# Patient Record
Sex: FEMALE | Race: BLACK OR AFRICAN AMERICAN | NOT HISPANIC OR LATINO | URBAN - METROPOLITAN AREA
[De-identification: names, ages, dates, MRNs, and addresses within clinical notes are randomized per-mention and may not be internally consistent; named-entity substitution may affect disease eponyms.]

---

## 2018-07-31 ENCOUNTER — EMERGENCY (EMERGENCY)
Facility: HOSPITAL | Age: 48
LOS: 1 days | Discharge: HOME | End: 2018-07-31
Attending: EMERGENCY MEDICINE
Payer: SELF-PAY

## 2018-07-31 VITALS
HEART RATE: 83 BPM | OXYGEN SATURATION: 99 % | SYSTOLIC BLOOD PRESSURE: 148 MMHG | WEIGHT: 229.94 LBS | DIASTOLIC BLOOD PRESSURE: 89 MMHG | HEIGHT: 63 IN | RESPIRATION RATE: 18 BRPM | TEMPERATURE: 98 F

## 2018-07-31 VITALS
DIASTOLIC BLOOD PRESSURE: 81 MMHG | RESPIRATION RATE: 18 BRPM | SYSTOLIC BLOOD PRESSURE: 138 MMHG | OXYGEN SATURATION: 100 % | TEMPERATURE: 98 F | HEART RATE: 75 BPM

## 2018-07-31 PROBLEM — Z00.00 ENCOUNTER FOR PREVENTIVE HEALTH EXAMINATION: Status: ACTIVE | Noted: 2018-07-31

## 2018-07-31 PROCEDURE — 99283 EMERGENCY DEPT VISIT LOW MDM: CPT

## 2018-07-31 RX ORDER — CYCLOBENZAPRINE HYDROCHLORIDE 10 MG/1
1 TABLET, FILM COATED ORAL
Qty: 14 | Refills: 0 | OUTPATIENT
Start: 2018-07-31 | End: 2018-08-06

## 2018-07-31 RX ORDER — LOSARTAN POTASSIUM 100 MG/1
1 TABLET, FILM COATED ORAL
Qty: 0 | Refills: 0 | COMMUNITY

## 2018-07-31 RX ORDER — METFORMIN HYDROCHLORIDE 850 MG/1
1 TABLET ORAL
Qty: 0 | Refills: 0 | COMMUNITY

## 2018-07-31 NOTE — ED PROVIDER NOTE - MUSCULOSKELETAL, MLM
Spine appears normal, FROM, bilateral distal pulses symmetrical and intact. Bilateral hand  intact, 5/5 motor function, sensation to light touch intact, capillary refill intact, no ecchymosis, no cervical midline TTP, FROM spine, (+) minimal soft tissue TTP.

## 2018-07-31 NOTE — ED ADULT NURSE NOTE - CHIEF COMPLAINT QUOTE
c/o right arm pain x4 months, sunday started paresthesia in her fingers  atraumatic, no injury noted.

## 2018-07-31 NOTE — ED ADULT NURSE NOTE - NSIMPLEMENTINTERV_GEN_ALL_ED
Implemented All Universal Safety Interventions:  [place] to call system. Call bell, personal items and telephone within reach. Instruct patient to call for assistance. Room bathroom lighting operational. Non-slip footwear when patient is off stretcher. Physically safe environment: no spills, clutter or unnecessary equipment. Stretcher in lowest position, wheels locked, appropriate side rails in place.

## 2018-07-31 NOTE — ED PROVIDER NOTE - CARE PLAN
Principal Discharge DX:	Muscle strain Principal Discharge DX:	Muscle strain  Secondary Diagnosis:	Cervical radiculopathy

## 2018-07-31 NOTE — ED PROVIDER NOTE - ATTENDING CONTRIBUTION TO CARE
I have personally performed a face to face diagnostic evaluation on this patient.  I have reviewed the PA note and agree with the history, exam, and plan of care, except as noted.  History and Exam by me shows 48 female c.o right arm pain for the past 4 months, now feeling numbness and tingling to right thumb, 2nd index finger, on exam muscle strength 5/5, sensation intact, radial pulse (+), cap refill < 2 sec, patient given number of orthopedic spine [phone] to call for prompt follow up this week, to get steroids and muscle relaxer.

## 2018-07-31 NOTE — ED PROVIDER NOTE - MEDICAL DECISION MAKING DETAILS
47 y/o female presents today with c/o9 right sided arm pain x 4 months. pt notes she presented today due to persistent pain increasing with numbness/tingling to right thumb. 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 pt describes pain as aching, non-radiating, worsened with arm movements, and currently 6/10. pt notes she feels that she has "bone pain".  pt notes she takes Excedrin for pain with some relief. benign exam, mininmal soft tissue TTP. NO cervical midline TTP. 5/5 motor function. Pt offered xray though unlikely in the an atraumatic setting but declined. Pt advised of importance for ortho follow up. Advised may benefit from PT, possible outpatient  MRI. pt educated on radiculopathy, paresthesias, and educated on possible carpal tunnel syndrome. Pt advised of symptoms to be cautious of warranting immediate return. pt advised of importance for close follow up with PCP and specialist.

## 2018-07-31 NOTE — ED PROVIDER NOTE - OBJECTIVE STATEMENT
47 y/o female presents today with c/o9 right sided arm pain x 4 months. pt notes she presented today due to persistent pain increasing with numbness/tingling to right thumb. 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 pt describes pain as aching, non-radiating, worsened with arm movements, and currently 6/10. pt notes she feels that she has "bone pain".  pt notes she takes Excedrin for pain with some relief. pt denies trauma, HA currently, CP, SOB, fever, weakness, or any other complaints.

## 2020-05-31 ENCOUNTER — TRANSCRIPTION ENCOUNTER (OUTPATIENT)
Age: 50
End: 2020-05-31

## 2020-10-05 PROBLEM — I10 ESSENTIAL (PRIMARY) HYPERTENSION: Chronic | Status: ACTIVE | Noted: 2018-07-31

## 2020-10-05 PROBLEM — E11.9 TYPE 2 DIABETES MELLITUS WITHOUT COMPLICATIONS: Chronic | Status: ACTIVE | Noted: 2018-07-31

## 2020-11-10 ENCOUNTER — APPOINTMENT (OUTPATIENT)
Dept: OBGYN | Facility: CLINIC | Age: 50
End: 2020-11-10
Payer: COMMERCIAL

## 2020-11-10 VITALS
DIASTOLIC BLOOD PRESSURE: 88 MMHG | HEIGHT: 63 IN | HEART RATE: 82 BPM | SYSTOLIC BLOOD PRESSURE: 145 MMHG | TEMPERATURE: 97.9 F | WEIGHT: 219 LBS | BODY MASS INDEX: 38.8 KG/M2

## 2020-11-10 DIAGNOSIS — I10 ESSENTIAL (PRIMARY) HYPERTENSION: ICD-10-CM

## 2020-11-10 DIAGNOSIS — E11.9 TYPE 2 DIABETES MELLITUS W/OUT COMPLICATIONS: ICD-10-CM

## 2020-11-10 DIAGNOSIS — E10.641 TYPE 1 DIABETES MELLITUS WITH HYPOGLYCEMIA WITH COMA: ICD-10-CM

## 2020-11-10 DIAGNOSIS — Z01.411 ENCOUNTER FOR GYNECOLOGICAL EXAMINATION (GENERAL) (ROUTINE) WITH ABNORMAL FINDINGS: ICD-10-CM

## 2020-11-10 DIAGNOSIS — E66.9 OBESITY, UNSPECIFIED: ICD-10-CM

## 2020-11-10 DIAGNOSIS — N95.1 MENOPAUSAL AND FEMALE CLIMACTERIC STATES: ICD-10-CM

## 2020-11-10 DIAGNOSIS — Z78.9 OTHER SPECIFIED HEALTH STATUS: ICD-10-CM

## 2020-11-10 PROCEDURE — 99386 PREV VISIT NEW AGE 40-64: CPT

## 2020-11-10 PROCEDURE — 99072 ADDL SUPL MATRL&STAF TM PHE: CPT

## 2020-11-10 NOTE — PHYSICAL EXAM
[Appropriately responsive] : appropriately responsive [Alert] : alert [No Acute Distress] : no acute distress [No Lymphadenopathy] : no lymphadenopathy [Regular Rate Rhythm] : regular rate rhythm [No Murmurs] : no murmurs [Clear to Auscultation B/L] : clear to auscultation bilaterally [Soft] : soft [Non-tender] : non-tender [Non-distended] : non-distended [No HSM] : No HSM [No Lesions] : no lesions [No Mass] : no mass [Oriented x3] : oriented x3 [Examination Of The Breasts] : a normal appearance [No Masses] : no breast masses were palpable [Labia Majora] : normal [Labia Minora] : normal [Normal] : normal [Normal Position] : in a normal position [Enlarged ___ wks] : enlarged [unfilled] ~Uweeks [Uterine Adnexae] : normal

## 2020-11-10 NOTE — HISTORY OF PRESENT ILLNESS
[FreeTextEntry1] : 50 y.o. P 1031 LNMP  11/3/20 for  annual exam.  Oct. 2020  , pt feels well. pt reports VMS. PMH - Diabetes - Metformin, 2000mg daily  HTN - Losartan 50mg once daily , Glaucoma - treated with eye drops. PSHx - negative FH - breast cancer - mother, pat. aunt. , Aneurysm, - pat GM, Diabetes - both sides of family, Alzheimers - MGM, , HTN - mother, both grandmothers , SH - negative, GYN  hx - hx of abnl. paps s/p colpo. , OB hx -  male,  FT, ETOP x 3, ROS - .

## 2020-11-10 NOTE — DISCUSSION/SUMMARY
[FreeTextEntry1] : A - WwV\par     obesity\par      NIDDM\par     HTN\par     female climacteric state\par     chronic  constipation \par \par p- f/u for results\par     referral for pelvic sono given to assess uterus and adnexae secondary to body habitus\par      exercise encouraged\par      nutritional counseling provided\par       pap , FSH, LH, done\par      referral for mammo an Dexa given \par     referral for G.I. colorectal screening given , DEVENDRA Olvera.

## 2020-11-11 LAB
FSH SERPL-MCNC: 6.7 IU/L
LH SERPL-ACNC: 12.7 IU/L

## 2020-11-13 LAB — HPV HIGH+LOW RISK DNA PNL CVX: NOT DETECTED

## 2020-11-16 LAB — CYTOLOGY CVX/VAG DOC THIN PREP: ABNORMAL

## 2020-11-23 ENCOUNTER — APPOINTMENT (OUTPATIENT)
Dept: OBGYN | Facility: CLINIC | Age: 50
End: 2020-11-23
Payer: COMMERCIAL

## 2020-11-23 ENCOUNTER — ASOB RESULT (OUTPATIENT)
Age: 50
End: 2020-11-23

## 2020-11-23 PROCEDURE — 76830 TRANSVAGINAL US NON-OB: CPT

## 2020-11-23 PROCEDURE — 76857 US EXAM PELVIC LIMITED: CPT

## 2020-11-25 ENCOUNTER — NON-APPOINTMENT (OUTPATIENT)
Age: 50
End: 2020-11-25

## 2020-11-25 DIAGNOSIS — R19.00 INTRA-ABDOMINAL AND PELVIC SWELLING, MASS AND LUMP, UNSPECIFIED SITE: ICD-10-CM

## 2020-12-02 ENCOUNTER — NON-APPOINTMENT (OUTPATIENT)
Age: 50
End: 2020-12-02

## 2021-01-14 ENCOUNTER — APPOINTMENT (OUTPATIENT)
Dept: MRI IMAGING | Facility: IMAGING CENTER | Age: 51
End: 2021-01-14

## 2021-02-12 DIAGNOSIS — R10.2 PELVIC AND PERINEAL PAIN: ICD-10-CM

## 2021-02-24 ENCOUNTER — NON-APPOINTMENT (OUTPATIENT)
Age: 51
End: 2021-02-24

## 2021-03-08 ENCOUNTER — NON-APPOINTMENT (OUTPATIENT)
Age: 51
End: 2021-03-08

## 2022-03-19 ENCOUNTER — APPOINTMENT (OUTPATIENT)
Dept: MAMMOGRAPHY | Facility: CLINIC | Age: 52
End: 2022-03-19
Payer: MEDICAID

## 2022-03-19 ENCOUNTER — OUTPATIENT (OUTPATIENT)
Dept: OUTPATIENT SERVICES | Facility: HOSPITAL | Age: 52
LOS: 1 days | End: 2022-03-19
Payer: MEDICAID

## 2022-03-19 DIAGNOSIS — Z00.8 ENCOUNTER FOR OTHER GENERAL EXAMINATION: ICD-10-CM

## 2022-03-19 PROCEDURE — 77067 SCR MAMMO BI INCL CAD: CPT | Mod: 26

## 2022-03-19 PROCEDURE — 77067 SCR MAMMO BI INCL CAD: CPT

## 2022-03-19 PROCEDURE — 77063 BREAST TOMOSYNTHESIS BI: CPT | Mod: 26

## 2022-03-19 PROCEDURE — 77063 BREAST TOMOSYNTHESIS BI: CPT

## 2022-03-22 PROBLEM — Z00.00 ENCOUNTER FOR PREVENTIVE HEALTH EXAMINATION: Noted: 2022-03-22

## 2022-03-24 ENCOUNTER — OUTPATIENT (OUTPATIENT)
Dept: OUTPATIENT SERVICES | Facility: HOSPITAL | Age: 52
LOS: 1 days | End: 2022-03-24
Payer: MEDICAID

## 2022-03-24 ENCOUNTER — APPOINTMENT (OUTPATIENT)
Dept: ULTRASOUND IMAGING | Facility: CLINIC | Age: 52
End: 2022-03-24
Payer: MEDICAID

## 2022-03-24 ENCOUNTER — APPOINTMENT (OUTPATIENT)
Dept: ULTRASOUND IMAGING | Facility: CLINIC | Age: 52
End: 2022-03-24

## 2022-03-24 DIAGNOSIS — Z00.8 ENCOUNTER FOR OTHER GENERAL EXAMINATION: ICD-10-CM

## 2022-03-24 PROCEDURE — 76642 ULTRASOUND BREAST LIMITED: CPT

## 2022-03-24 PROCEDURE — 76642 ULTRASOUND BREAST LIMITED: CPT | Mod: 26,50

## 2022-03-30 ENCOUNTER — APPOINTMENT (OUTPATIENT)
Dept: ULTRASOUND IMAGING | Facility: CLINIC | Age: 52
End: 2022-03-30
Payer: MEDICAID

## 2022-03-30 ENCOUNTER — RESULT REVIEW (OUTPATIENT)
Age: 52
End: 2022-03-30

## 2022-03-30 ENCOUNTER — OUTPATIENT (OUTPATIENT)
Dept: OUTPATIENT SERVICES | Facility: HOSPITAL | Age: 52
LOS: 1 days | End: 2022-03-30
Payer: MEDICAID

## 2022-03-30 DIAGNOSIS — R92.8 OTHER ABNORMAL AND INCONCLUSIVE FINDINGS ON DIAGNOSTIC IMAGING OF BREAST: ICD-10-CM

## 2022-03-30 DIAGNOSIS — Z00.8 ENCOUNTER FOR OTHER GENERAL EXAMINATION: ICD-10-CM

## 2022-03-30 PROCEDURE — 77065 DX MAMMO INCL CAD UNI: CPT

## 2022-03-30 PROCEDURE — 88305 TISSUE EXAM BY PATHOLOGIST: CPT

## 2022-03-30 PROCEDURE — 19083 BX BREAST 1ST LESION US IMAG: CPT

## 2022-03-30 PROCEDURE — A4648: CPT

## 2022-03-30 PROCEDURE — 88305 TISSUE EXAM BY PATHOLOGIST: CPT | Mod: 26

## 2022-03-30 PROCEDURE — 77065 DX MAMMO INCL CAD UNI: CPT | Mod: 26,RT

## 2022-03-30 PROCEDURE — 19083 BX BREAST 1ST LESION US IMAG: CPT | Mod: RT

## 2022-03-31 ENCOUNTER — TRANSCRIPTION ENCOUNTER (OUTPATIENT)
Age: 52
End: 2022-03-31

## 2022-04-14 ENCOUNTER — APPOINTMENT (OUTPATIENT)
Dept: SURGICAL ONCOLOGY | Facility: CLINIC | Age: 52
End: 2022-04-14
Payer: COMMERCIAL

## 2022-04-14 VITALS
RESPIRATION RATE: 16 BRPM | BODY MASS INDEX: 38.98 KG/M2 | HEART RATE: 81 BPM | TEMPERATURE: 97.8 F | HEIGHT: 63 IN | DIASTOLIC BLOOD PRESSURE: 88 MMHG | WEIGHT: 220 LBS | SYSTOLIC BLOOD PRESSURE: 136 MMHG | OXYGEN SATURATION: 98 %

## 2022-04-14 DIAGNOSIS — Z80.3 FAMILY HISTORY OF MALIGNANT NEOPLASM OF BREAST: ICD-10-CM

## 2022-04-14 PROCEDURE — 99204 OFFICE O/P NEW MOD 45 MIN: CPT

## 2022-04-14 RX ORDER — SIMVASTATIN 80 MG/1
TABLET, FILM COATED ORAL
Refills: 0 | Status: ACTIVE | COMMUNITY

## 2022-04-14 RX ORDER — METFORMIN HYDROCHLORIDE 625 MG/1
TABLET ORAL
Refills: 0 | Status: ACTIVE | COMMUNITY

## 2022-04-14 RX ORDER — LOSARTAN POTASSIUM 100 MG/1
TABLET, FILM COATED ORAL
Refills: 0 | Status: ACTIVE | COMMUNITY

## 2022-04-14 NOTE — PAST MEDICAL HISTORY
[Postmenopausal] : The patient is postmenopausal [Menarche Age ____] : age at menarche was [unfilled] [Menopause Age____] : age at menopause was [unfilled] [History of Hormone Replacement Treatment] : has no history of hormone replacement treatment [Total Preg ___] : G[unfilled] [Live Births ___] : P[unfilled]  [Abortions ___] : Abortions:[unfilled] [AB Spont ___] : miscarriages: [unfilled]  [Age At Live Birth ___] : Age at live birth: [unfilled] [FreeTextEntry6] : none [FreeTextEntry7] : 2 months [FreeTextEntry8] : 8 months

## 2022-04-14 NOTE — ASSESSMENT
[FreeTextEntry1] : The patient is a 51 year old female with right breast IDP\par \par As these are high risk markers, excision is recommended.  I discussed with the patient that papillary lesions are difficult for pathologist to read on needle biopsy and thus excision is always recommended, to allow histologic evaluation of the entire lesion.  There is approximately 5 to 8% incidence of upgrading of these lesions, most commonly to either atypical hyperplasia or DCIS.  If this is not upgraded, this is not a breast cancer risk factor and she should return to routine breast care.  \par \par Preoperative, intraoperative, and postoperative considerations including preoperative localization by Radiology of this nonpalpable mass, intraoperative anesthetic management, and what she can expect in postoperative period were reviewed.\par  \par Risks, benefits, and alternatives to proposed surgical procedure were reviewed and all questions were answered to her satisfaction.\par \par Plan:\par –Right breast excisional biopsy with MagSeed localization (1 site)\par - med clearance requested

## 2022-04-14 NOTE — PHYSICAL EXAM
[Normocephalic] : normocephalic [Atraumatic] : atraumatic [EOMI] : extra ocular movement intact [PERRL] : pupils equal, round and reactive to light [Sclera nonicteric] : sclera nonicteric [Supple] : supple [No Supraclavicular Adenopathy] : no supraclavicular adenopathy [No Cervical Adenopathy] : no cervical adenopathy [No Thyromegaly] : no thyromegaly [Examined in the supine and seated position] : examined in the supine and seated position [Symmetrical] : symmetrical [Bra Size: ___] : Bra Size: [unfilled] [Grade 3] : Ptosis Grade 3 [No dominant masses] : no dominant masses in right breast  [No dominant masses] : no dominant masses left breast [No Nipple Retraction] : no left nipple retraction [No Nipple Discharge] : no left nipple discharge [Breast Mass Right Breast ___cm] : no masses [Breast Mass Left Breast ___cm] : no masses [Breast Nipple Inversion] : nipples not inverted [Breast Nipple Retraction] : nipples not retracted [Breast Nipple Flattening] : nipples not flattened [Breast Nipple Fissures] : nipples not fissured [Breast Abnormal Lactation (Galactorrhea)] : no galactorrhea [Breast Abnormal Secretion Bloody Fluid] : no bloody discharge [Breast Abnormal Secretion Serous Fluid] : no serous discharge [Breast Abnormal Secretion Opalescent Fluid] : no milky discharge [No Axillary Lymphadenopathy] : no left axillary lymphadenopathy [No Edema] : no edema [No Rashes] : no rashes [No Ulceration] : no ulceration [de-identified] : non-labored respirations  [de-identified] : 9:00 CNB site healing well, no masses

## 2022-04-14 NOTE — HISTORY OF PRESENT ILLNESS
[FreeTextEntry1] : The patient is a 51 year old female here for initial evaluation of Right breast IDP.\par \par The patient reports that she has not had any recent breast imaging since a mammogram in her early 20s after breast trauma. She notes that she only decided to get imaging now due to her mother having breast cancer diagnosed age 64 and her paternal aunt diagnosed with breast cancer age 50s. \par \par Her most recent imaging showed:\par 3/19/2022 B/L SM (NW), TC 18.3%, scattered fibroglandular densities\par –L UO posterior breast circumscribed mass\par –R LO breast circumscribed mass–L borderline prominent lymph nodes (1/21 Covid vaccination)\par –BR0, bilateral targeted ultrasound\par \par 3/24/2022 B/L ultrasound\par –L 2:00 N12 14 mm cyst\par –R 9:00 N7 5 x 4 x 7 mm hypoechoic mass–>BX\par –No adenopathy; prominent lymph nodes bilaterally\par –BR4a\par \par 3/30/2022 R USG–CNB\par –R 9:00 N7 (ribbon): IDP\par \par Today, the patient reports no breast complaints. Denies pain, masses, skin changes, or nipple discharge. \par \par She reports HTN, DM, and HLD for which she takes losartan, metformin, and simvastatin. \par She has not had any prior surgeries.\par Family history of breast cancer noted above. Denies any other cancer history.\par

## 2022-04-14 NOTE — CONSULT LETTER
[Dear  ___] : Dear  [unfilled], [Consult Letter:] : I had the pleasure of evaluating your patient, [unfilled]. [Please see my note below.] : Please see my note below. [Consult Closing:] : Thank you very much for allowing me to participate in the care of this patient.  If you have any questions, please do not hesitate to contact me. [Sincerely,] : Sincerely, [FreeTextEntry3] : Emelia Mendenhall MD\par Breast Surgeon\par Division of Surgical Oncology\par Department of Surgery\par [address] [place] [address]\[place] [address] \par Tel: [phone]\par Fax: [phone]\par Email: [email or web address]

## 2022-04-25 ENCOUNTER — OUTPATIENT (OUTPATIENT)
Dept: OUTPATIENT SERVICES | Facility: HOSPITAL | Age: 52
LOS: 1 days | End: 2022-04-25
Payer: MEDICAID

## 2022-04-25 VITALS
HEIGHT: 62 IN | HEART RATE: 75 BPM | OXYGEN SATURATION: 99 % | DIASTOLIC BLOOD PRESSURE: 80 MMHG | SYSTOLIC BLOOD PRESSURE: 130 MMHG | WEIGHT: 227.96 LBS | RESPIRATION RATE: 16 BRPM | TEMPERATURE: 98 F

## 2022-04-25 DIAGNOSIS — D24.1 BENIGN NEOPLASM OF RIGHT BREAST: ICD-10-CM

## 2022-04-25 DIAGNOSIS — I10 ESSENTIAL (PRIMARY) HYPERTENSION: ICD-10-CM

## 2022-04-25 DIAGNOSIS — E11.9 TYPE 2 DIABETES MELLITUS WITHOUT COMPLICATIONS: ICD-10-CM

## 2022-04-25 LAB
A1C WITH ESTIMATED AVERAGE GLUCOSE RESULT: 6.8 % — HIGH (ref 4–5.6)
ANION GAP SERPL CALC-SCNC: 12 MMOL/L — SIGNIFICANT CHANGE UP (ref 7–14)
BUN SERPL-MCNC: 15 MG/DL — SIGNIFICANT CHANGE UP (ref 7–23)
CALCIUM SERPL-MCNC: 9.3 MG/DL — SIGNIFICANT CHANGE UP (ref 8.4–10.5)
CHLORIDE SERPL-SCNC: 102 MMOL/L — SIGNIFICANT CHANGE UP (ref 98–107)
CO2 SERPL-SCNC: 22 MMOL/L — SIGNIFICANT CHANGE UP (ref 22–31)
CREAT SERPL-MCNC: 0.78 MG/DL — SIGNIFICANT CHANGE UP (ref 0.5–1.3)
EGFR: 91 ML/MIN/1.73M2 — SIGNIFICANT CHANGE UP
ESTIMATED AVERAGE GLUCOSE: 148 — SIGNIFICANT CHANGE UP
GLUCOSE SERPL-MCNC: 152 MG/DL — HIGH (ref 70–99)
HCG SERPL-ACNC: <5 MIU/ML — SIGNIFICANT CHANGE UP
HCT VFR BLD CALC: 38.9 % — SIGNIFICANT CHANGE UP (ref 34.5–45)
HGB BLD-MCNC: 13.2 G/DL — SIGNIFICANT CHANGE UP (ref 11.5–15.5)
MCHC RBC-ENTMCNC: 30.8 PG — SIGNIFICANT CHANGE UP (ref 27–34)
MCHC RBC-ENTMCNC: 33.9 GM/DL — SIGNIFICANT CHANGE UP (ref 32–36)
MCV RBC AUTO: 90.7 FL — SIGNIFICANT CHANGE UP (ref 80–100)
NRBC # BLD: 0 /100 WBCS — SIGNIFICANT CHANGE UP
NRBC # FLD: 0 K/UL — SIGNIFICANT CHANGE UP
PLATELET # BLD AUTO: 250 K/UL — SIGNIFICANT CHANGE UP (ref 150–400)
POTASSIUM SERPL-MCNC: 4.5 MMOL/L — SIGNIFICANT CHANGE UP (ref 3.5–5.3)
POTASSIUM SERPL-SCNC: 4.5 MMOL/L — SIGNIFICANT CHANGE UP (ref 3.5–5.3)
RBC # BLD: 4.29 M/UL — SIGNIFICANT CHANGE UP (ref 3.8–5.2)
RBC # FLD: 11.5 % — SIGNIFICANT CHANGE UP (ref 10.3–14.5)
SODIUM SERPL-SCNC: 136 MMOL/L — SIGNIFICANT CHANGE UP (ref 135–145)
WBC # BLD: 6.07 K/UL — SIGNIFICANT CHANGE UP (ref 3.8–10.5)
WBC # FLD AUTO: 6.07 K/UL — SIGNIFICANT CHANGE UP (ref 3.8–10.5)

## 2022-04-25 PROCEDURE — 93010 ELECTROCARDIOGRAM REPORT: CPT

## 2022-04-25 NOTE — H&P PST ADULT - GASTROINTESTINAL COMMENTS
Pt c/o of diffuse tenderness / pressure with palpation - chronic constipation and fibroids Hx fibroids and recent postmenopausal bleeding - pt seen by GYN and w/u being done - surgical biopsy needs to be done

## 2022-04-25 NOTE — H&P PST ADULT - SKIN/BREAST COMMENTS
right breast lump noted on Mammo - biopsy benign No pertinent past medical history right breast lump noted on Mammo - biopsy benign - pt denies palpation

## 2022-04-25 NOTE — H&P PST ADULT - NSICDXPASTMEDICALHX_GEN_ALL_CORE_FT
PAST MEDICAL HISTORY:  Breast lump      PAST MEDICAL HISTORY:  Breast lump     DM (diabetes mellitus)     Fibroids     HLD (hyperlipidemia)     HTN (hypertension)     Obesity

## 2022-04-25 NOTE — H&P PST ADULT - NSANTHOSAYNRD_GEN_A_CORE
No. SHASHANK screening performed.  STOP BANG Legend: 0-2 = LOW Risk; 3-4 = INTERMEDIATE Risk; 5-8 = HIGH Risk

## 2022-04-25 NOTE — H&P PST ADULT - HISTORY OF PRESENT ILLNESS
Pt is a 51 yr old female scheduled for Right Breast Excisional Biopsy with magseed Localization 1 site with Dr Mendenhall tentatively 5/2/22 - Pt had Mammo and noted right mass found - biopsy benign - now for surgery Hx HTN, DM and HLD  Patient instructed to contact surgeon's office concerning COVID test prior to surgery

## 2022-04-28 ENCOUNTER — RESULT REVIEW (OUTPATIENT)
Age: 52
End: 2022-04-28

## 2022-04-28 ENCOUNTER — OUTPATIENT (OUTPATIENT)
Dept: OUTPATIENT SERVICES | Facility: HOSPITAL | Age: 52
LOS: 1 days | End: 2022-04-28
Payer: MEDICAID

## 2022-04-28 ENCOUNTER — APPOINTMENT (OUTPATIENT)
Dept: ULTRASOUND IMAGING | Facility: IMAGING CENTER | Age: 52
End: 2022-04-28
Payer: COMMERCIAL

## 2022-04-28 DIAGNOSIS — Z00.8 ENCOUNTER FOR OTHER GENERAL EXAMINATION: ICD-10-CM

## 2022-04-28 PROCEDURE — 76642 ULTRASOUND BREAST LIMITED: CPT | Mod: 26,50

## 2022-04-28 PROCEDURE — 76642 ULTRASOUND BREAST LIMITED: CPT

## 2022-04-29 VITALS
RESPIRATION RATE: 16 BRPM | SYSTOLIC BLOOD PRESSURE: 168 MMHG | HEART RATE: 83 BPM | WEIGHT: 227.96 LBS | TEMPERATURE: 98 F | OXYGEN SATURATION: 100 % | HEIGHT: 62 IN | DIASTOLIC BLOOD PRESSURE: 79 MMHG

## 2022-04-29 NOTE — ASU PREOPERATIVE ASSESSMENT, ADULT (IPARK ONLY) - FALL HARM RISK - UNIVERSAL INTERVENTIONS
Bed in lowest position, wheels locked, appropriate side rails in place/Call bell, personal items and telephone in reach/Instruct patient to call for assistance before getting out of bed or chair/Non-slip footwear when patient is out of bed/[place] to call system/Physically safe environment - no spills, clutter or unnecessary equipment/Purposeful Proactive Rounding/Room/bathroom lighting operational, light cord in reach

## 2022-05-01 ENCOUNTER — TRANSCRIPTION ENCOUNTER (OUTPATIENT)
Age: 52
End: 2022-05-01

## 2022-05-02 ENCOUNTER — RESULT REVIEW (OUTPATIENT)
Age: 52
End: 2022-05-02

## 2022-05-02 ENCOUNTER — APPOINTMENT (OUTPATIENT)
Dept: MAMMOGRAPHY | Facility: IMAGING CENTER | Age: 52
End: 2022-05-02
Payer: COMMERCIAL

## 2022-05-02 ENCOUNTER — OUTPATIENT (OUTPATIENT)
Dept: OUTPATIENT SERVICES | Facility: HOSPITAL | Age: 52
LOS: 1 days | Discharge: HOME | End: 2022-05-02
Payer: MEDICAID

## 2022-05-02 ENCOUNTER — APPOINTMENT (OUTPATIENT)
Dept: SURGICAL ONCOLOGY | Facility: AMBULATORY SURGERY CENTER | Age: 52
End: 2022-05-02

## 2022-05-02 ENCOUNTER — OUTPATIENT (OUTPATIENT)
Dept: OUTPATIENT SERVICES | Facility: HOSPITAL | Age: 52
LOS: 1 days | End: 2022-05-02
Payer: COMMERCIAL

## 2022-05-02 ENCOUNTER — APPOINTMENT (OUTPATIENT)
Dept: MAMMOGRAPHY | Facility: IMAGING CENTER | Age: 52
End: 2022-05-02

## 2022-05-02 ENCOUNTER — TRANSCRIPTION ENCOUNTER (OUTPATIENT)
Age: 52
End: 2022-05-02

## 2022-05-02 VITALS
HEART RATE: 86 BPM | DIASTOLIC BLOOD PRESSURE: 86 MMHG | SYSTOLIC BLOOD PRESSURE: 155 MMHG | RESPIRATION RATE: 15 BRPM | OXYGEN SATURATION: 98 % | TEMPERATURE: 98 F

## 2022-05-02 DIAGNOSIS — Z00.8 ENCOUNTER FOR OTHER GENERAL EXAMINATION: ICD-10-CM

## 2022-05-02 DIAGNOSIS — D24.1 BENIGN NEOPLASM OF RIGHT BREAST: ICD-10-CM

## 2022-05-02 LAB — GLUCOSE BLDC GLUCOMTR-MCNC: 167 MG/DL — HIGH (ref 70–99)

## 2022-05-02 PROCEDURE — C1889: CPT

## 2022-05-02 PROCEDURE — 19125 EXCISION BREAST LESION: CPT

## 2022-05-02 PROCEDURE — 19281 PERQ DEVICE BREAST 1ST IMAG: CPT

## 2022-05-02 PROCEDURE — 76098 X-RAY EXAM SURGICAL SPECIMEN: CPT | Mod: 26

## 2022-05-02 PROCEDURE — C1739: CPT

## 2022-05-02 PROCEDURE — 19281 PERQ DEVICE BREAST 1ST IMAG: CPT | Mod: RT

## 2022-05-02 PROCEDURE — 88305 TISSUE EXAM BY PATHOLOGIST: CPT | Mod: 26

## 2022-05-02 PROCEDURE — 76098 X-RAY EXAM SURGICAL SPECIMEN: CPT

## 2022-05-02 NOTE — ASU DISCHARGE PLAN (ADULT/PEDIATRIC) - CARE PROVIDER_API CALL
Emelia Mendenhall)  Surgery  [address] [place] [address]  Phone: [phone]  Fax: [phone]  Follow Up Time: 1 week

## 2022-05-02 NOTE — ASU DISCHARGE PLAN (ADULT/PEDIATRIC) - ASU DC SPECIAL INSTRUCTIONSFT
May shower today. Keep tegaderm/telfa on for 48 hours. After 48 hours, may remove, but keep steri-strips until follow-up with Dr. Mendenhall in one week.    Alternate tylenol/motrin for pain as directed on the bottle instructions    Keep bra on May shower tomorrow. Keep tegaderm/telfa on for 48 hours. After 48 hours, may remove, but keep steri-strips until follow-up with Dr. Mendenhall in one week.    Alternate tylenol/motrin for pain as directed on the bottle instructions    Keep bra on

## 2022-05-05 ENCOUNTER — APPOINTMENT (OUTPATIENT)
Dept: SURGICAL ONCOLOGY | Facility: CLINIC | Age: 52
End: 2022-05-05
Payer: MEDICAID

## 2022-05-05 VITALS
SYSTOLIC BLOOD PRESSURE: 149 MMHG | DIASTOLIC BLOOD PRESSURE: 86 MMHG | WEIGHT: 224 LBS | BODY MASS INDEX: 39.69 KG/M2 | HEART RATE: 64 BPM | TEMPERATURE: 97.9 F | HEIGHT: 63 IN | RESPIRATION RATE: 17 BRPM | OXYGEN SATURATION: 98 %

## 2022-05-05 DIAGNOSIS — T14.8XXA OTHER INJURY OF UNSPECIFIED BODY REGION, INITIAL ENCOUNTER: ICD-10-CM

## 2022-05-05 PROCEDURE — 99024 POSTOP FOLLOW-UP VISIT: CPT

## 2022-05-05 NOTE — PHYSICAL EXAM
[Normocephalic] : normocephalic [Atraumatic] : atraumatic [EOMI] : extra ocular movement intact [PERRL] : pupils equal, round and reactive to light [Sclera nonicteric] : sclera nonicteric [Examined in the supine and seated position] : examined in the supine and seated position [de-identified] : non-labored respirations  [de-identified] : Right breast surgical dressing c/d/i, with very minimal dried strikethrough on one portion of the dressing. At 6:00 underneath breast, some ecchymosis noted, no obvious hematoma. Faint scab overlying likely prior wire localization site and likely site of bleeding.

## 2022-05-05 NOTE — CONSULT LETTER
[Dear  ___] : Dear  [unfilled], [Courtesy Letter:] : I had the pleasure of seeing your patient, [unfilled], in my office today. [Please see my note below.] : Please see my note below. [Sincerely,] : Sincerely, [FreeTextEntry3] : Emelia Mendenhall MD\par Breast Surgeon\par Division of Surgical Oncology\par Department of Surgery\par [address] [place] [address]\[place] [address] \par Tel: [phone]\par Fax: [phone]\par Email: [email or web address]

## 2022-05-05 NOTE — HISTORY OF PRESENT ILLNESS
[FreeTextEntry1] : The patient is a 51 year old female here for initial evaluation of Right breast IDP now s/p R breast excisional biopsy 5/2/2022. \par \par Prior history:\par The patient reports that she has not had any recent breast imaging since a mammogram in her early 20s after breast trauma. She notes that she only decided to get imaging now due to her mother having breast cancer diagnosed age 64 and her paternal aunt diagnosed with breast cancer age 50s. \par \par Her most recent imaging showed:\par 3/19/2022 B/L SM (NW), TC 18.3%, scattered fibroglandular densities\par –L UO posterior breast circumscribed mass\par –R LO breast circumscribed mass–L borderline prominent lymph nodes (1/21 Covid vaccination)\par –BR0, bilateral targeted ultrasound\par \par 3/24/2022 B/L ultrasound\par –L 2:00 N12 14 mm cyst\par –R 9:00 N7 5 x 4 x 7 mm hypoechoic mass–>BX\par –No adenopathy; prominent lymph nodes bilaterally\par –BR4a\par \par 3/30/2022 R USG–CNB\par –R 9:00 N7 (ribbon): IDP\par \par The patient reports no breast complaints. Denies pain, masses, skin changes, or nipple discharge. \par \par She reports HTN, DM, and HLD for which she takes losartan, metformin, and simvastatin. \par She has not had any prior surgeries.\par Family history of breast cancer noted above. Denies any other cancer history.\par \par 5/2/2022 R breast excisional biopsy\par  - PATH PENDING\par \par Postop: \par The patient reports that the second morning after her surgery, she noticed some moisture coming from underneath the breast while sleeping, but she did not look and assumed it was sweat. When she awoke, she saw that it was blood staining her shirt. She put a clean paper towel underneath and then monitored it during the day. It continued to drain blood, which she described as copious. The surgical dressing on her breast covering her nipple, however, remained clean, dry and intact. She could not visualize or feel a source of the blood on her lower breast. She left a voicemail on the office line, but did not speak to the on call team. She reports that the bleeding continued, but has slowed. Denies fevers/chills.

## 2022-05-05 NOTE — ASSESSMENT
[FreeTextEntry1] : The patient is a 51 year old female here for initial evaluation of Right breast IDP now s/p R breast excisional biopsy 5/2/2022. Here for a unplanned follow-up due to complaint of right breast bleeding.\par \par Exam showed that the right breast surgical dressing was clean, dry, and intact, with very minimal dried strikethrough on one portion of the dressing. At 6:00 underneath breast, some ecchymosis was noted. There was no obvious hematoma. Faint scab was seen overlying likely prior wire localization site and likely site of bleeding.\par \par We discussed the likely scenario. The surgical dressing was taken down revealing clean steristrips, no evidence of bleeding. The wire localization site at this time appeared to be closed. A dry gauze dressing was placed over the site in case of any additional spotting. We discussed that this has likely already stopped bleeding and any additional bleeding can be stopped with firm pressure to the area. There is no evidence of a breast hematoma, and no evidence of active bleeding.\par \par The patient still has her regularly scheduled postop visit on 5/18. She will return at that time for a follow-up, and we should also have pathology to review at that time.\par \par Plan:\par  - RTO f/u 5/18\par  - awaiting surgical pathology

## 2022-05-06 LAB — SURGICAL PATHOLOGY STUDY: SIGNIFICANT CHANGE UP

## 2022-05-17 PROBLEM — D24.1 INTRADUCTAL PAPILLOMA OF RIGHT BREAST: Status: ACTIVE | Noted: 2022-04-12

## 2022-05-18 ENCOUNTER — APPOINTMENT (OUTPATIENT)
Dept: SURGICAL ONCOLOGY | Facility: CLINIC | Age: 52
End: 2022-05-18
Payer: MEDICAID

## 2022-05-18 VITALS
DIASTOLIC BLOOD PRESSURE: 82 MMHG | RESPIRATION RATE: 16 BRPM | SYSTOLIC BLOOD PRESSURE: 134 MMHG | WEIGHT: 224 LBS | OXYGEN SATURATION: 98 % | BODY MASS INDEX: 39.69 KG/M2 | HEIGHT: 63 IN | HEART RATE: 81 BPM

## 2022-05-18 DIAGNOSIS — D24.1 BENIGN NEOPLASM OF RIGHT BREAST: ICD-10-CM

## 2022-05-18 PROCEDURE — 99024 POSTOP FOLLOW-UP VISIT: CPT

## 2022-05-18 NOTE — HISTORY OF PRESENT ILLNESS
[FreeTextEntry1] : The patient is a 51 year old female here for initial evaluation of Right breast IDP now s/p R breast excisional biopsy 5/2/2022. \par \par Prior history:\par The patient reports that she has not had any recent breast imaging since a mammogram in her early 20s after breast trauma. She notes that she only decided to get imaging now due to her mother having breast cancer diagnosed age 64 and her paternal aunt diagnosed with breast cancer age 50s. \par \par Her most recent imaging showed:\par 3/19/2022 B/L SM (NW), TC 18.3%, scattered fibroglandular densities\par –L UO posterior breast circumscribed mass\par –R LO breast circumscribed mass–L borderline prominent lymph nodes (1/21 Covid vaccination)\par –BR0, bilateral targeted ultrasound\par \par 3/24/2022 B/L ultrasound\par –L 2:00 N12 14 mm cyst\par –R 9:00 N7 5 x 4 x 7 mm hypoechoic mass–>BX\par –No adenopathy; prominent lymph nodes bilaterally\par –BR4a\par \par 3/30/2022 R USG–CNB\par –R 9:00 N7 (ribbon): IDP\par \par The patient reports no breast complaints. Denies pain, masses, skin changes, or nipple discharge. \par \par She reports HTN, DM, and HLD for which she takes losartan, metformin, and simvastatin. \par She has not had any prior surgeries.\par Family history of breast cancer noted above. Denies any other cancer history.\par \par 5/2/2022 R breast excisional biopsy\par  - Sclerosing duct papilloma.\par - Fibrocystic changes with florid duct hyperplasia and calcifications.\par - Duct ectasia.\par - Prior biopsy site changes.\par \par Postop: \par The patient reports that the second morning after her surgery, she noticed some moisture coming from underneath the breast while sleeping, but she did not look and assumed it was sweat. When she awoke, she saw that it was blood staining her shirt. She put a clean paper towel underneath and then monitored it during the day. It continued to drain blood, which she described as copious. The surgical dressing on her breast covering her nipple, however, remained clean, dry and intact. She could not visualize or feel a source of the blood on her lower breast. She left a voicemail on the office line, but did not speak to the on call team. She reports that the bleeding continued, but has slowed. Denies fevers/chills.\par \par Interval history:\par No bleeding from the breast since last visit. She does note that at night, she has significant breast pain behind the nipple. She continues to wear a sports bra. She sleeps on her back or on her left side, not the right. \par Denies any swelling, redness, fevers, nipple or incisional drainage/discharge.

## 2022-05-18 NOTE — PHYSICAL EXAM
[Normocephalic] : normocephalic [Atraumatic] : atraumatic [EOMI] : extra ocular movement intact [PERRL] : pupils equal, round and reactive to light [Sclera nonicteric] : sclera nonicteric [Examined in the supine and seated position] : examined in the supine and seated position [Breast Nipple Inversion Right] : nipple not inverted [Breast Nipple Retraction Right] : nipple not retracted [Breast Nipple Flattening Right] : nipple not flattened [Breast Nipple Fissures Right] : nipple not fissured [Breast Abnormal Lactation (Galactorrhea) Right] : no galactorrhea [Breast Abnormal Secretion Serous Fluid Right] : no serous discharge [Breast Abnormal Secretion Bloody Fluid Right] : no bloody discharge [Breast Abnormal Secretion Opalescent Fluid Right] : no milky discharge [Breast Mass Right Breast ___cm] : no masses [de-identified] : non-labored respirations  [de-identified] : Right breast incision c/d/i, healing well. No erythema. No nipple ulceration or discharge. Overlying skin is soft, supple. No fluctuance.

## 2022-05-18 NOTE — CONSULT LETTER
[Dear  ___] : Dear  [unfilled], [Courtesy Letter:] : I had the pleasure of seeing your patient, [unfilled], in my office today. [Please see my note below.] : Please see my note below. [Consult Closing:] : Thank you very much for allowing me to participate in the care of this patient.  If you have any questions, please do not hesitate to contact me. [Sincerely,] : Sincerely, [FreeTextEntry3] : Emelia Mendenhall MD\par Breast Surgeon\par Division of Surgical Oncology\par Department of Surgery\par [address] [place] [address]\[place] [address] \par Tel: [phone]\par Fax: [phone]\par Email: [email or web address]

## 2022-05-18 NOTE — ASSESSMENT
[FreeTextEntry1] : The patient is a 51 year old female here for initial evaluation of Right breast IDP now s/p R breast excisional biopsy 5/2/2022 here for followup.\par \par Surgical pathology now available:\par 5/2/2022 R breast excisional biopsy\par  - Sclerosing duct papilloma.\par - Fibrocystic changes with florid duct hyperplasia and calcifications.\par - Duct ectasia.\par - Prior biopsy site changes.\par \par Pt seen last week due to concern about bleeding from breast, likely from wire localization site. Now resolved. \par Pt now with complaints of nipple pain. No evidence of infection or seroma that may be contributing to pain symptom. We discussed that pain may be due to the surgery and inflammation to nerves near the nipple. Incision appears to be healing well. Continue Tylenol and Motrin and wearing a good supportive bra. Pain should resolve by itself.\par \par Plan:\par  - Next breast imaging 3/2023\par  - RTO PRN

## 2022-06-02 RX ORDER — METFORMIN HYDROCHLORIDE 850 MG/1
1 TABLET ORAL
Qty: 0 | Refills: 0 | DISCHARGE

## 2022-06-02 RX ORDER — LOSARTAN POTASSIUM 100 MG/1
1 TABLET, FILM COATED ORAL
Qty: 0 | Refills: 0 | DISCHARGE

## 2022-06-02 RX ORDER — FAMOTIDINE 10 MG/ML
0 INJECTION INTRAVENOUS
Qty: 0 | Refills: 0 | DISCHARGE

## 2022-06-02 RX ORDER — SIMVASTATIN 20 MG/1
1 TABLET, FILM COATED ORAL
Qty: 0 | Refills: 0 | DISCHARGE

## 2022-06-29 NOTE — ED ADULT NURSE NOTE - NSSISCREENINGQ1_ED_A_ED
----- Message from Esvin Farris sent at 6/29/2022 12:21 PM CDT -----  Type:  RX Refill Request    Who Called:  Ke brooks/ [facility]  Refill or New Rx:  Refill  RX Name and Strength:  alprostadiL (EDEX) 20 mcg injection  How is the patient currently taking it? (ex. 1XDay):  as directed  Is this a 30 day or 90 day RX:  30  Preferred Pharmacy with phone number:      [facility] [place] - [address]  [address]  [place] [address]  Phone: [phone] Fax: [phone]      Local or Mail Order:  Local  Ordering Provider:  Henrique Mitchell Call Back Number:  [phone]    Additional Information:  Please advise - -Thank you       No

## 2022-10-05 PROBLEM — N63.0 UNSPECIFIED LUMP IN UNSPECIFIED BREAST: Chronic | Status: ACTIVE | Noted: 2022-04-25

## 2022-10-05 PROBLEM — D21.9 BENIGN NEOPLASM OF CONNECTIVE AND OTHER SOFT TISSUE, UNSPECIFIED: Chronic | Status: ACTIVE | Noted: 2022-04-25

## 2022-10-05 PROBLEM — I10 ESSENTIAL (PRIMARY) HYPERTENSION: Chronic | Status: ACTIVE | Noted: 2022-04-25

## 2022-10-05 PROBLEM — E11.9 TYPE 2 DIABETES MELLITUS WITHOUT COMPLICATIONS: Chronic | Status: ACTIVE | Noted: 2022-04-25

## 2022-10-05 PROBLEM — E66.9 OBESITY, UNSPECIFIED: Chronic | Status: ACTIVE | Noted: 2022-04-25

## 2022-10-05 PROBLEM — E78.5 HYPERLIPIDEMIA, UNSPECIFIED: Chronic | Status: ACTIVE | Noted: 2022-04-25

## 2022-10-24 ENCOUNTER — APPOINTMENT (OUTPATIENT)
Dept: GASTROENTEROLOGY | Facility: CLINIC | Age: 52
End: 2022-10-24

## 2022-10-24 VITALS
SYSTOLIC BLOOD PRESSURE: 128 MMHG | DIASTOLIC BLOOD PRESSURE: 80 MMHG | HEIGHT: 63 IN | WEIGHT: 220 LBS | BODY MASS INDEX: 38.98 KG/M2 | TEMPERATURE: 96.6 F

## 2022-10-24 DIAGNOSIS — Z20.822 ENCOUNTER FOR PREPROCEDURAL LABORATORY EXAMINATION: ICD-10-CM

## 2022-10-24 DIAGNOSIS — Z78.9 OTHER SPECIFIED HEALTH STATUS: ICD-10-CM

## 2022-10-24 DIAGNOSIS — Z01.812 ENCOUNTER FOR PREPROCEDURAL LABORATORY EXAMINATION: ICD-10-CM

## 2022-10-24 PROCEDURE — 99204 OFFICE O/P NEW MOD 45 MIN: CPT

## 2022-10-24 RX ORDER — GABAPENTIN 300 MG/1
300 CAPSULE ORAL
Qty: 90 | Refills: 0 | Status: ACTIVE | COMMUNITY
Start: 2022-08-31

## 2022-10-24 RX ORDER — SODIUM PICOSULFATE, MAGNESIUM OXIDE, AND ANHYDROUS CITRIC ACID 10; 3.5; 12 MG/160ML; G/160ML; G/160ML
10-3.5-12 MG-GM LIQUID ORAL
Qty: 1 | Refills: 0 | Status: COMPLETED | COMMUNITY
Start: 2022-10-24 | End: 2022-10-26

## 2022-10-24 RX ORDER — IBUPROFEN 600 MG/1
600 TABLET, FILM COATED ORAL
Qty: 60 | Refills: 0 | Status: ACTIVE | COMMUNITY
Start: 2022-09-01

## 2022-10-24 RX ORDER — TERBINAFINE HYDROCHLORIDE 250 MG/1
250 TABLET ORAL
Qty: 30 | Refills: 0 | Status: ACTIVE | COMMUNITY
Start: 2022-10-05

## 2022-10-24 RX ORDER — METRONIDAZOLE 500 MG/1
500 TABLET ORAL
Qty: 14 | Refills: 0 | Status: ACTIVE | COMMUNITY
Start: 2022-06-05

## 2022-10-24 RX ORDER — METHOCARBAMOL 500 MG/1
500 TABLET, FILM COATED ORAL
Qty: 120 | Refills: 0 | Status: ACTIVE | COMMUNITY
Start: 2022-08-31

## 2022-10-24 RX ORDER — DOCUSATE SODIUM 100 MG/1
100 CAPSULE, LIQUID FILLED ORAL
Qty: 60 | Refills: 0 | Status: ACTIVE | COMMUNITY
Start: 2022-08-31

## 2022-10-24 RX ORDER — DOCUSATE SODIUM 50MG AND SENNOSIDES 8.6MG 8.6; 5 MG/1; MG/1
8.6-5 TABLET, FILM COATED ORAL
Qty: 30 | Refills: 0 | Status: ACTIVE | COMMUNITY
Start: 2022-06-03

## 2022-10-24 RX ORDER — OXYCODONE 5 MG/1
5 TABLET ORAL
Qty: 10 | Refills: 0 | Status: ACTIVE | COMMUNITY
Start: 2022-08-31

## 2022-10-24 RX ORDER — BLOOD SUGAR DIAGNOSTIC
STRIP MISCELLANEOUS
Qty: 100 | Refills: 0 | Status: ACTIVE | COMMUNITY
Start: 2022-09-16

## 2022-10-24 NOTE — PHYSICAL EXAM
[Normal] : normal bowel sounds, non-tender, no masses, soft, no no hepato-splenomegaly [de-identified] : Healed longitudinal surgical scar from umbilicus to pubic symphysis.  Mildly tender to deep palpation.

## 2022-10-24 NOTE — ASSESSMENT
[FreeTextEntry1] : Impression: Persistent chronic constipation after hysterectomy due to long-term laxative use and dependence.  Currently with bowel movement every 4 days or so on Metamucil twice daily and probiotics but stool is hard and difficult to expel and patient has crampy abdominal pain with this.  Occasional use of senna pod with Epsom salts gives relief.\par \par Plan: We will try Amitiza 24 mcg twice daily.  May continue Metamucil and probiotics.  We will schedule screening colonoscopy with Clenpiq prep (Gavilyte if not covered).

## 2022-10-24 NOTE — HISTORY OF PRESENT ILLNESS
[FreeTextEntry1] : 52-year-old female status post total abdominal hysterectomy 2 months ago for uterine fibroids which have been blamed for chronic constipation for many many years.  Patient had use laxatives on a regular basis prior to that.  Since the surgery however, her constipation has persisted.  She moves her bowels every 4 days or so but the stool is hard and difficult to eliminate and is associated with abdominal cramping.  She has tried numerous laxatives over the years, and was given Senokot after discharge from the hospital.  Currently is taking Metamucil twice daily with a probiotic and uses natural senna pod with Epsom salts on occasion which will generally produce a bowel movement.  No family history of colon cancer or polyps, patient has not yet had a colonoscopy.  No response to MiraLAX in the past.

## 2022-11-14 LAB — SARS-COV-2 N GENE NPH QL NAA+PROBE: NOT DETECTED

## 2022-11-16 ENCOUNTER — APPOINTMENT (OUTPATIENT)
Dept: GASTROENTEROLOGY | Facility: AMBULATORY MEDICAL SERVICES | Age: 52
End: 2022-11-16

## 2022-11-16 PROCEDURE — 45378 DIAGNOSTIC COLONOSCOPY: CPT | Mod: 33

## 2023-01-16 NOTE — H&P PST ADULT - ALCOHOL USE HISTORY SINGLE SELECT
No Labs/EKG/Imaging Studies/Medications yes... Mirvaso Pregnancy And Lactation Text: This medication has not been assigned a Pregnancy Risk Category. It is unknown if the medication is excreted in breast milk.

## 2023-04-28 RX ORDER — LUBIPROSTONE 24 UG/1
24 CAPSULE ORAL TWICE DAILY
Qty: 60 | Refills: 5 | Status: ACTIVE | COMMUNITY
Start: 2022-10-24 | End: 1900-01-01

## 2023-05-01 ENCOUNTER — APPOINTMENT (OUTPATIENT)
Dept: GASTROENTEROLOGY | Facility: CLINIC | Age: 53
End: 2023-05-01
Payer: MEDICAID

## 2023-05-01 VITALS
OXYGEN SATURATION: 98 % | BODY MASS INDEX: 37.21 KG/M2 | HEART RATE: 81 BPM | DIASTOLIC BLOOD PRESSURE: 74 MMHG | TEMPERATURE: 97.7 F | WEIGHT: 210 LBS | HEIGHT: 63 IN | SYSTOLIC BLOOD PRESSURE: 130 MMHG

## 2023-05-01 DIAGNOSIS — R10.13 EPIGASTRIC PAIN: ICD-10-CM

## 2023-05-01 DIAGNOSIS — K59.04 CHRONIC IDIOPATHIC CONSTIPATION: ICD-10-CM

## 2023-05-01 DIAGNOSIS — K21.9 GASTRO-ESOPHAGEAL REFLUX DISEASE W/OUT ESOPHAGITIS: ICD-10-CM

## 2023-05-01 PROCEDURE — 99214 OFFICE O/P EST MOD 30 MIN: CPT

## 2023-05-01 RX ORDER — PANTOPRAZOLE 40 MG/1
40 TABLET, DELAYED RELEASE ORAL
Qty: 1 | Refills: 3 | Status: ACTIVE | COMMUNITY
Start: 2023-05-01 | End: 1900-01-01

## 2023-05-01 NOTE — ASSESSMENT
[FreeTextEntry1] : Impression: Chronic constipation responded well to Amitiza back in October, recurred and worsened once Amitiza was discontinued.  New onset GERD/dyspeptic symptoms with nausea.  Epigastric tenderness on exam.  Probable functional dyspepsia.  May have been exacerbated by worsening constipation and excessive laxative use.\par \par Plan: Pantoprazole 40 mg p.o. daily for 8 weeks then use on demand.  Continue Amitiza twice daily.  Can use senna tea every third day if necessary.  Perform EGD if dyspeptic symptoms persist or recur.  Screening colonoscopy recall November 2027

## 2023-05-30 ENCOUNTER — APPOINTMENT (OUTPATIENT)
Dept: GASTROENTEROLOGY | Facility: CLINIC | Age: 53
End: 2023-05-30

## 2023-07-19 ENCOUNTER — APPOINTMENT (OUTPATIENT)
Dept: ULTRASOUND IMAGING | Facility: CLINIC | Age: 53
End: 2023-07-19
Payer: MEDICAID

## 2023-07-19 ENCOUNTER — OUTPATIENT (OUTPATIENT)
Dept: OUTPATIENT SERVICES | Facility: HOSPITAL | Age: 53
LOS: 1 days | End: 2023-07-19
Payer: MEDICAID

## 2023-07-19 DIAGNOSIS — Z00.8 ENCOUNTER FOR OTHER GENERAL EXAMINATION: ICD-10-CM

## 2023-07-19 PROCEDURE — 76536 US EXAM OF HEAD AND NECK: CPT | Mod: 26

## 2023-07-19 PROCEDURE — 76536 US EXAM OF HEAD AND NECK: CPT

## 2023-09-07 ENCOUNTER — EMERGENCY (EMERGENCY)
Facility: HOSPITAL | Age: 53
LOS: 1 days | Discharge: HOME | End: 2023-09-07
Admitting: STUDENT IN AN ORGANIZED HEALTH CARE EDUCATION/TRAINING PROGRAM
Payer: MEDICAID

## 2023-09-07 VITALS
DIASTOLIC BLOOD PRESSURE: 92 MMHG | OXYGEN SATURATION: 100 % | SYSTOLIC BLOOD PRESSURE: 159 MMHG | TEMPERATURE: 98 F | HEART RATE: 87 BPM | RESPIRATION RATE: 18 BRPM

## 2023-09-07 PROCEDURE — 71046 X-RAY EXAM CHEST 2 VIEWS: CPT | Mod: 26

## 2023-09-07 PROCEDURE — 93010 ELECTROCARDIOGRAM REPORT: CPT

## 2023-09-07 PROCEDURE — 99285 EMERGENCY DEPT VISIT HI MDM: CPT | Mod: 25

## 2023-09-07 RX ORDER — CYCLOBENZAPRINE HYDROCHLORIDE 10 MG/1
10 TABLET, FILM COATED ORAL ONCE
Refills: 0 | Status: COMPLETED | OUTPATIENT
Start: 2023-09-07 | End: 2023-09-07

## 2023-09-07 RX ORDER — ACETAMINOPHEN 500 MG
975 TABLET ORAL ONCE
Refills: 0 | Status: COMPLETED | OUTPATIENT
Start: 2023-09-07 | End: 2023-09-07

## 2023-09-07 NOTE — ED PROVIDER NOTE - NSFOLLOWUPINSTRUCTIONS_ED_ALL_ED_FT
Follow up with a spine specialist for your neck pain radiating to your left arm. Your EKG is normal and your troponin (Cardiac Enzyme) is also normal but you should still follow up with a Cardiologist for screening.  The ER will contact you to arrange follow up. Advance activity as tolerated.  Continue all previously prescribed medications as directed.  Follow up with your primary care physician in 48-72 hours- bring copies of your results.  Return to the ER for worsening or persistent symptoms, and/or ANY NEW OR CONCERNING SYMPTOMS. THIS INCLUDES BUT IS NOT LIMITED TO SEVERE PAIN, SHORTNESS OF BREATH, NAUSEA, VOMITING OR FOR ANY OTHER SYMPTOMS THAT CONCERN YOU. If you have issues obtaining follow up, please call: [phone] to obtain a doctor or specialist who takes your insurance in your area.  You may call [phone] to make an appointment with the internal medicine clinic.

## 2023-09-07 NOTE — ED ADULT TRIAGE NOTE - CHIEF COMPLAINT QUOTE
Pt c/o left shoulder, arm and chest pain X 1 day. Denies sob, abd pain, n/v/d, fevers/chills. Appears comfortable.  Hx: HTN, DM

## 2023-09-07 NOTE — ED PROVIDER NOTE - OBJECTIVE STATEMENT
53F with pmh HTN, DM p/w atraumatic L sided back pain, L sided cp x 3 days. Pain is constant, pleuritic, non exertional. Pain radiates down L shoulder and L arm. Pt states she feels slight dyspnea with cp onset. No prior hx of similar. No prior hx of stress test/ echo. Denies any dizziness, hx of pe/dvt, recent prolonged immobilization, abd pain, URI symptoms, trauma, heavy lifting.

## 2023-09-07 NOTE — ED PROVIDER NOTE - NSPTACCESSSVCSAPPTDETAILS_ED_ALL_ED_FT
Urgent spine center follow up for cervicalgia, Urgent Cardiology follow up for chest pain for the next available appointments.

## 2023-09-07 NOTE — ED PROVIDER NOTE - PHYSICAL EXAMINATION
constitutional: well appearing no acute distress, sitting comfortably   HEENT: head- normocephalic, atraumatic                 eyes- PERRLA                neck- full rom, no rigidity, no LAD  Cardiac: regular rate and rhythm, normal S1 S2 no murmurs rubs or gallops  Chest: reproducible L sided anterior chest tenderness   Respiratory: able to speak in full sentences, no wheezing rales or rhonchi, lungs CTA b/l   Abd: Soft non tender non distended, no guarding, no palpable hernias or masses   Msk: L shoulder tenderness and paraspinal c spine tenderness.   Neuro: A x O x 4, face symmetric,

## 2023-09-07 NOTE — ED PROVIDER NOTE - PROGRESS NOTE DETAILS
KOLE Tilley: Received signout on this pt. Trop is <6, EKG is not ischemic, pain is reproducible with ROM and with palpation of the L trapezius. Will D/C with spine center follow up for a suspected cervicogenic etiology and will give Cardiology follow up for screening as pain includes the L arm and she has risk factors including DM. Engaged in shared decision making with the pt regarding admission vs D/C, will D/C with follow up via the discharge lounge.

## 2023-09-07 NOTE — ED PROVIDER NOTE - CLINICAL SUMMARY MEDICAL DECISION MAKING FREE TEXT BOX
53F with pmh HTN, DM p/w atraumatic L sided back pain, L sided cp x 3 day. EKG no ischemic changes  r/o acs, vs pe, vs msk     Plan:  - labs  - ekg  - chest xr  - tylenol/ flexeril

## 2023-09-07 NOTE — ED PROVIDER NOTE - PATIENT PORTAL LINK FT
You can access the FollowMyHealth Patient Portal offered by [facility] by registering at the following website: [email or web address]. By joining [facility]’s FollowMyHealth portal, you will also be able to view your health information using other applications (apps) compatible with our system.

## 2023-09-08 LAB
ALBUMIN SERPL ELPH-MCNC: 4 G/DL — SIGNIFICANT CHANGE UP (ref 3.3–5)
ALP SERPL-CCNC: 137 U/L — HIGH (ref 40–120)
ALT FLD-CCNC: 20 U/L — SIGNIFICANT CHANGE UP (ref 4–33)
ANION GAP SERPL CALC-SCNC: 9 MMOL/L — SIGNIFICANT CHANGE UP (ref 7–14)
AST SERPL-CCNC: 20 U/L — SIGNIFICANT CHANGE UP (ref 4–32)
BASOPHILS # BLD AUTO: 0.02 K/UL — SIGNIFICANT CHANGE UP (ref 0–0.2)
BASOPHILS NFR BLD AUTO: 0.3 % — SIGNIFICANT CHANGE UP (ref 0–2)
BILIRUB SERPL-MCNC: 0.3 MG/DL — SIGNIFICANT CHANGE UP (ref 0.2–1.2)
BUN SERPL-MCNC: 13 MG/DL — SIGNIFICANT CHANGE UP (ref 7–23)
CALCIUM SERPL-MCNC: 9.6 MG/DL — SIGNIFICANT CHANGE UP (ref 8.4–10.5)
CHLORIDE SERPL-SCNC: 99 MMOL/L — SIGNIFICANT CHANGE UP (ref 98–107)
CO2 SERPL-SCNC: 27 MMOL/L — SIGNIFICANT CHANGE UP (ref 22–31)
CREAT SERPL-MCNC: 0.74 MG/DL — SIGNIFICANT CHANGE UP (ref 0.5–1.3)
D DIMER BLD IA.RAPID-MCNC: 200 NG/ML DDU — SIGNIFICANT CHANGE UP
EGFR: 95 ML/MIN/1.73M2 — SIGNIFICANT CHANGE UP
EOSINOPHIL # BLD AUTO: 0.07 K/UL — SIGNIFICANT CHANGE UP (ref 0–0.5)
EOSINOPHIL NFR BLD AUTO: 1.1 % — SIGNIFICANT CHANGE UP (ref 0–6)
GLUCOSE SERPL-MCNC: 213 MG/DL — HIGH (ref 70–99)
HCT VFR BLD CALC: 41.1 % — SIGNIFICANT CHANGE UP (ref 34.5–45)
HGB BLD-MCNC: 13.6 G/DL — SIGNIFICANT CHANGE UP (ref 11.5–15.5)
IANC: 4.14 K/UL — SIGNIFICANT CHANGE UP (ref 1.8–7.4)
IMM GRANULOCYTES NFR BLD AUTO: 0.3 % — SIGNIFICANT CHANGE UP (ref 0–0.9)
LYMPHOCYTES # BLD AUTO: 1.85 K/UL — SIGNIFICANT CHANGE UP (ref 1–3.3)
LYMPHOCYTES # BLD AUTO: 28.5 % — SIGNIFICANT CHANGE UP (ref 13–44)
MCHC RBC-ENTMCNC: 29.5 PG — SIGNIFICANT CHANGE UP (ref 27–34)
MCHC RBC-ENTMCNC: 33.1 GM/DL — SIGNIFICANT CHANGE UP (ref 32–36)
MCV RBC AUTO: 89.2 FL — SIGNIFICANT CHANGE UP (ref 80–100)
MONOCYTES # BLD AUTO: 0.4 K/UL — SIGNIFICANT CHANGE UP (ref 0–0.9)
MONOCYTES NFR BLD AUTO: 6.2 % — SIGNIFICANT CHANGE UP (ref 2–14)
NEUTROPHILS # BLD AUTO: 4.14 K/UL — SIGNIFICANT CHANGE UP (ref 1.8–7.4)
NEUTROPHILS NFR BLD AUTO: 63.6 % — SIGNIFICANT CHANGE UP (ref 43–77)
NRBC # BLD: 0 /100 WBCS — SIGNIFICANT CHANGE UP (ref 0–0)
NRBC # FLD: 0 K/UL — SIGNIFICANT CHANGE UP (ref 0–0)
PLATELET # BLD AUTO: 237 K/UL — SIGNIFICANT CHANGE UP (ref 150–400)
POTASSIUM SERPL-MCNC: 4.3 MMOL/L — SIGNIFICANT CHANGE UP (ref 3.5–5.3)
POTASSIUM SERPL-SCNC: 4.3 MMOL/L — SIGNIFICANT CHANGE UP (ref 3.5–5.3)
PROT SERPL-MCNC: 7.2 G/DL — SIGNIFICANT CHANGE UP (ref 6–8.3)
RBC # BLD: 4.61 M/UL — SIGNIFICANT CHANGE UP (ref 3.8–5.2)
RBC # FLD: 11.4 % — SIGNIFICANT CHANGE UP (ref 10.3–14.5)
SODIUM SERPL-SCNC: 135 MMOL/L — SIGNIFICANT CHANGE UP (ref 135–145)
TROPONIN T, HIGH SENSITIVITY RESULT: <6 NG/L — SIGNIFICANT CHANGE UP
WBC # BLD: 6.5 K/UL — SIGNIFICANT CHANGE UP (ref 3.8–10.5)
WBC # FLD AUTO: 6.5 K/UL — SIGNIFICANT CHANGE UP (ref 3.8–10.5)

## 2023-09-08 RX ORDER — DIAZEPAM 5 MG
1 TABLET ORAL
Qty: 9 | Refills: 0
Start: 2023-09-08 | End: 2023-09-10

## 2023-09-08 RX ORDER — IBUPROFEN 200 MG
600 TABLET ORAL ONCE
Refills: 0 | Status: COMPLETED | OUTPATIENT
Start: 2023-09-08 | End: 2023-09-08

## 2023-09-08 RX ORDER — DIAZEPAM 5 MG
5 TABLET ORAL ONCE
Refills: 0 | Status: DISCONTINUED | OUTPATIENT
Start: 2023-09-08 | End: 2023-09-08

## 2023-09-08 RX ORDER — LIDOCAINE 4 G/100G
1 CREAM TOPICAL ONCE
Refills: 0 | Status: DISCONTINUED | OUTPATIENT
Start: 2023-09-08 | End: 2023-09-11

## 2023-09-08 RX ADMIN — Medication 5 MILLIGRAM(S): at 02:35

## 2023-09-08 RX ADMIN — Medication 600 MILLIGRAM(S): at 02:35

## 2023-09-08 RX ADMIN — Medication 975 MILLIGRAM(S): at 00:58

## 2023-09-08 RX ADMIN — CYCLOBENZAPRINE HYDROCHLORIDE 10 MILLIGRAM(S): 10 TABLET, FILM COATED ORAL at 00:58

## 2023-09-08 NOTE — ED ADULT NURSE NOTE - OBJECTIVE STATEMENT
54 y/o female, a&ox4, received to intake rm 4. Pt c/o left sided shoulder pain, radiating to the left arm and chest for the past few days. Pt denying any strenuous activity or trauma, unknown etiology. Skin intact, no decreased ROM, no signs of trauma or injury noted Denies CP, SOB, or dyspnea at this time. Respirations are even and unlabored, no signs of respiratory distress. 20GIV placed to [facility], labs collected and sent off. Pt medicated as per MD orders.

## 2023-09-08 NOTE — ED ADULT NURSE NOTE - NSFALLUNIVINTERV_ED_ALL_ED
Bed/Stretcher in lowest position, wheels locked, appropriate side rails in place/Call bell, personal items and telephone in reach/Instruct patient to call for assistance before getting out of bed/chair/stretcher/Non-slip footwear applied when patient is off stretcher/[place] to call system/Physically safe environment - no spills, clutter or unnecessary equipment/Purposeful proactive rounding/Room/bathroom lighting operational, light cord in reach

## 2023-09-11 ENCOUNTER — APPOINTMENT (OUTPATIENT)
Dept: ORTHOPEDIC SURGERY | Facility: CLINIC | Age: 53
End: 2023-09-11
Payer: MEDICAID

## 2023-09-11 VITALS
DIASTOLIC BLOOD PRESSURE: 82 MMHG | BODY MASS INDEX: 38.98 KG/M2 | HEART RATE: 88 BPM | SYSTOLIC BLOOD PRESSURE: 150 MMHG | OXYGEN SATURATION: 99 % | HEIGHT: 63 IN | WEIGHT: 220 LBS

## 2023-09-11 DIAGNOSIS — M54.2 CERVICALGIA: ICD-10-CM

## 2023-09-11 DIAGNOSIS — M47.812 SPONDYLOSIS W/OUT MYELOPATHY OR RADICULOPATHY, CERVICAL REGION: ICD-10-CM

## 2023-09-11 PROCEDURE — 72040 X-RAY EXAM NECK SPINE 2-3 VW: CPT

## 2023-09-11 PROCEDURE — 99204 OFFICE O/P NEW MOD 45 MIN: CPT | Mod: 25

## 2023-09-28 ENCOUNTER — APPOINTMENT (OUTPATIENT)
Dept: ORTHOPEDIC SURGERY | Facility: CLINIC | Age: 53
End: 2023-09-28
Payer: MEDICAID

## 2023-09-28 VITALS — BODY MASS INDEX: 38.98 KG/M2 | HEIGHT: 63 IN | WEIGHT: 220 LBS

## 2023-09-28 PROCEDURE — 99214 OFFICE O/P EST MOD 30 MIN: CPT

## 2023-11-29 ENCOUNTER — RX RENEWAL (OUTPATIENT)
Age: 53
End: 2023-11-29

## 2023-11-29 RX ORDER — DICLOFENAC SODIUM 75 MG/1
75 TABLET, DELAYED RELEASE ORAL
Qty: 60 | Refills: 1 | Status: ACTIVE | COMMUNITY
Start: 2023-09-11 | End: 1900-01-01

## 2023-12-11 ENCOUNTER — APPOINTMENT (OUTPATIENT)
Dept: ORTHOPEDIC SURGERY | Facility: CLINIC | Age: 53
End: 2023-12-11
Payer: MEDICAID

## 2023-12-11 VITALS — BODY MASS INDEX: 38.27 KG/M2 | WEIGHT: 216 LBS | HEIGHT: 63 IN

## 2023-12-11 PROCEDURE — 99214 OFFICE O/P EST MOD 30 MIN: CPT

## 2024-05-02 ENCOUNTER — APPOINTMENT (OUTPATIENT)
Dept: ORTHOPEDIC SURGERY | Facility: CLINIC | Age: 54
End: 2024-05-02
Payer: COMMERCIAL

## 2024-05-02 VITALS — BODY MASS INDEX: 38.27 KG/M2 | WEIGHT: 216 LBS | HEIGHT: 63 IN

## 2024-05-02 DIAGNOSIS — M54.12 RADICULOPATHY, CERVICAL REGION: ICD-10-CM

## 2024-05-02 PROCEDURE — 99214 OFFICE O/P EST MOD 30 MIN: CPT

## 2024-05-02 RX ORDER — METHYLPREDNISOLONE 4 MG/1
4 TABLET ORAL
Qty: 21 | Refills: 1 | Status: ACTIVE | COMMUNITY
Start: 2024-05-02 | End: 1900-01-01

## 2024-05-02 NOTE — DISCUSSION/SUMMARY
[Medication Risks Reviewed] : Medication risks reviewed [de-identified] : Left C7 radiculopathy. Cervical degenerative disc disease  Discussed all options. Continue Chirorpactic care. Provided Medrol Dose Andry  F/U in 2 weeks if no better.  All options discussed including rest, medicine, home exercise, acupuncture, Chiropractic care, Physical Therapy, Pain management, and last resort surgery. All questions were answered, all alternatives discussed, and the patient is in complete agreement with the treatment plan which the patient contributed to and discussed with me through the shared decision-making process. Follow-up appointment as instructed. Any issues and the patient will call or come in sooner.

## 2024-05-02 NOTE — PHYSICAL EXAM
[Normal] : Gait: normal [Stewart's Sign] : negative Stewart's sign [Pronator Drift] : negative pronator drift [SLR] : negative straight leg raise [de-identified] : 5 out of 5 motor strength, sensation is intact and symmetrical full range of motion flexion extension and rotation, no palpatory tenderness full range of motion of hips knees shoulders and elbows (all four extremities), no atrophy, negative straight leg raise, no pathological reflexes, no swelling, normal ambulation, no apparent distress skin is intact, no palpable lymph nodes, no upper or lower extremity instability, alert and oriented x3 and normal mood. Normal finger-to nose test.  No upper motor neuron findings [de-identified] : MRI Cervical 11/3/23 (Stand Up MRI)  Impression:  - C3-4 increasing broad posterior disc herniation and increasing left posterolateral component which extends increasingly into the left anterior recess and proximal foramen causing stenosis and left C4 nerve root impingement and increasing left C4 nerve root impingement. Broad extension of the disc herniation now abutting the left ventral margin of the cord and impressing on the left greater than the right ventral thecal sac to an increasing degree compared to previously. Peripheral encroachment of the disc toward the right foramen to a lesser degree compared to the left that again abuts the right C4 nerve root.   - C4-5 broad right predominant posterior disc herniation having a right posterolateral component impressing on the right ventral margin of the cord and extending predominantly and peripherally into the right anterior recess and foramen causing right C5 nerve root impingement with severe right foraminal stenosis. Lesser degree of left extension of disc herniation into the foramen causing left foraminal stenosis and left C5 nerve root impression but to a lesser degree than on the right. Findings are not appreciably changed.   - C5-6 broad posterior disc herniation increasing in size extending slightly increasing to the right causing increasing ventral cord impression, right slightly greater than left, with central spine stenosis. Broad extension of disc herniation peripherally into and narrowing the right foramen causing right foraminal stenosis and right C6 nerve root impingement, also slight increased compared to previously. Broad extension of disc herniation into the left neural foramen is also slightly increased with increasing left C6 nerve root impression.  - C6-7: posterior subligamentous disc herniation and increasing ventral thecal sac impression with peripheral encroachment of the disc into the proximal foramina.  - C7-T1: left eccentric posterior disc herniation which is now impressing on left ventral margin of the cord associated with focal T2 hyperintense radial annular tear.  - C2-3: posterior subligamentous disc bulging without change.  - Diffuse straightening of cervical lordosis.  - Some hypertrophy of the facets encroaching toward the foramina bilaterally.  ---------------------------------------------------------------------------     XR AP Lat Cervical 09/11/2023 -C4-6 disc degenerative disease- reviewed with patient.

## 2024-05-02 NOTE — HISTORY OF PRESENT ILLNESS
[Stable] : stable [de-identified] : 53 year old female presents for follow up of left cervical radiculopathy.  Last seen in Dec 2023 for this as well.  The pain originates at the left trapezial region, and radiates to the left pectoral region, left scapula and down the arm to the thumb. She states her pain had improved for a few months since last visit but started to return last week. She has been participating with chiropractic care earlier this week and felt mild relief.  Takes excedrin for the pain and has mild relief. She has no relief with diclofenac. Has MRI Cervical done in Nov 2023.  PMHx: DM on metformin and ozempic. No fever chills sweats nausea vomiting no bowel or bladder dysfunction, no recent weight loss or gain no night pain. This history is in addition to the intake form that I personally reviewed.

## 2024-05-02 NOTE — ADDENDUM
[FreeTextEntry1] : This note was written by Wanda Cole on 05/02/2024 acting as scribe for Dr. Dimitrios Forman M.D.   I, Dimitrios Forman MD, have read and attest that all the information, medical decision making and discharge instructions within are true and accurate.

## (undated) DEVICE — SUT VICRYL 3-0 27" SH UNDYED

## (undated) DEVICE — DRSG STERISTRIPS 0.5 X 4"

## (undated) DEVICE — ELCTR BOVIE TIP BLADE INSULATED 2.75" EDGE

## (undated) DEVICE — VENODYNE/SCD SLEEVE CALF MEDIUM

## (undated) DEVICE — ELCTR GROUNDING PAD ADULT COVIDIEN

## (undated) DEVICE — RADIOGRAPHY DVC SPEC TRANSPEC

## (undated) DEVICE — SUT SILK 2-0 30" SH

## (undated) DEVICE — GLV 7.5 PROTEXIS (WHITE)

## (undated) DEVICE — DRAPE CHEST BREAST 106" X 122"

## (undated) DEVICE — GLV 7 PROTEXIS (WHITE)

## (undated) DEVICE — GOWN LG

## (undated) DEVICE — SUT MONOCRYL 4-0 27" PS-2 UNDYED

## (undated) DEVICE — WARMING BLANKET LOWER ADULT

## (undated) DEVICE — POSITIONER STRAP ARMBOARD VELCRO TS-30

## (undated) DEVICE — SOL IRR POUR NS 0.9% 500ML